# Patient Record
Sex: FEMALE | Race: WHITE | HISPANIC OR LATINO | Employment: UNEMPLOYED | URBAN - METROPOLITAN AREA
[De-identification: names, ages, dates, MRNs, and addresses within clinical notes are randomized per-mention and may not be internally consistent; named-entity substitution may affect disease eponyms.]

---

## 2023-05-15 ENCOUNTER — HOSPITAL ENCOUNTER (INPATIENT)
Facility: HOSPITAL | Age: 73
LOS: 9 days | Discharge: HOME | End: 2023-05-25
Attending: EMERGENCY MEDICINE | Admitting: STUDENT IN AN ORGANIZED HEALTH CARE EDUCATION/TRAINING PROGRAM

## 2023-05-15 DIAGNOSIS — F39 MOOD DISORDER (HCC): Primary | ICD-10-CM

## 2023-05-15 DIAGNOSIS — E53.8 B12 DEFICIENCY: ICD-10-CM

## 2023-05-15 DIAGNOSIS — F03.90 MAJOR NEUROCOGNITIVE DISORDER (HCC): ICD-10-CM

## 2023-05-15 DIAGNOSIS — F09 COGNITIVE DISORDER: ICD-10-CM

## 2023-05-15 DIAGNOSIS — E66.9 OBESITY (BMI 35.0-39.9 WITHOUT COMORBIDITY): ICD-10-CM

## 2023-05-15 DIAGNOSIS — R44.3 HALLUCINATIONS: ICD-10-CM

## 2023-05-15 DIAGNOSIS — Z00.8 MEDICAL CLEARANCE FOR PSYCHIATRIC ADMISSION: ICD-10-CM

## 2023-05-15 DIAGNOSIS — Z96.653 S/P TOTAL KNEE REPLACEMENT, BILATERAL: ICD-10-CM

## 2023-05-15 DIAGNOSIS — R45.1 AGITATION: ICD-10-CM

## 2023-05-15 DIAGNOSIS — E55.9 VITAMIN D INSUFFICIENCY: ICD-10-CM

## 2023-05-15 DIAGNOSIS — I10 ESSENTIAL HYPERTENSION: ICD-10-CM

## 2023-05-15 PROCEDURE — GZHZZZZ GROUP PSYCHOTHERAPY: ICD-10-PCS | Performed by: STUDENT IN AN ORGANIZED HEALTH CARE EDUCATION/TRAINING PROGRAM

## 2023-05-15 PROCEDURE — GZ59ZZZ INDIVIDUAL PSYCHOTHERAPY, PSYCHOPHYSIOLOGICAL: ICD-10-PCS | Performed by: STUDENT IN AN ORGANIZED HEALTH CARE EDUCATION/TRAINING PROGRAM

## 2023-05-16 PROBLEM — Z00.8 MEDICAL CLEARANCE FOR PSYCHIATRIC ADMISSION: Status: ACTIVE | Noted: 2023-05-16

## 2023-05-16 PROBLEM — E66.9 OBESITY (BMI 35.0-39.9 WITHOUT COMORBIDITY): Status: ACTIVE | Noted: 2023-05-16

## 2023-05-16 LAB
ALBUMIN SERPL BCP-MCNC: 4.9 G/DL (ref 3.5–5)
ALP SERPL-CCNC: 97 U/L (ref 34–104)
ALT SERPL W P-5'-P-CCNC: 11 U/L (ref 7–52)
AMPHETAMINES SERPL QL SCN: NEGATIVE
ANION GAP SERPL CALCULATED.3IONS-SCNC: 13 MMOL/L (ref 4–13)
AST SERPL W P-5'-P-CCNC: 24 U/L (ref 13–39)
ATRIAL RATE: 76 BPM
BACTERIA UR QL AUTO: ABNORMAL /HPF
BARBITURATES UR QL: NEGATIVE
BASOPHILS # BLD AUTO: 0.05 THOUSANDS/ÂΜL (ref 0–0.1)
BASOPHILS NFR BLD AUTO: 1 % (ref 0–1)
BENZODIAZ UR QL: NEGATIVE
BILIRUB SERPL-MCNC: 0.29 MG/DL (ref 0.2–1)
BILIRUB UR QL STRIP: NEGATIVE
BUN SERPL-MCNC: 34 MG/DL (ref 5–25)
CALCIUM SERPL-MCNC: 9.7 MG/DL (ref 8.4–10.2)
CHLORIDE SERPL-SCNC: 107 MMOL/L (ref 96–108)
CLARITY UR: CLEAR
CO2 SERPL-SCNC: 22 MMOL/L (ref 21–32)
COCAINE UR QL: NEGATIVE
COLOR UR: ABNORMAL
CREAT SERPL-MCNC: 1.2 MG/DL (ref 0.6–1.3)
EOSINOPHIL # BLD AUTO: 0.34 THOUSAND/ÂΜL (ref 0–0.61)
EOSINOPHIL NFR BLD AUTO: 7 % (ref 0–6)
ERYTHROCYTE [DISTWIDTH] IN BLOOD BY AUTOMATED COUNT: 12.4 % (ref 11.6–15.1)
ETHANOL SERPL-MCNC: <10 MG/DL
GFR SERPL CREATININE-BSD FRML MDRD: 44 ML/MIN/1.73SQ M
GLUCOSE SERPL-MCNC: 106 MG/DL (ref 65–140)
GLUCOSE UR STRIP-MCNC: NEGATIVE MG/DL
HCT VFR BLD AUTO: 34.2 % (ref 34.8–46.1)
HGB BLD-MCNC: 11.4 G/DL (ref 11.5–15.4)
HGB UR QL STRIP.AUTO: NEGATIVE
HYALINE CASTS #/AREA URNS LPF: ABNORMAL /LPF
IMM GRANULOCYTES # BLD AUTO: 0.01 THOUSAND/UL (ref 0–0.2)
IMM GRANULOCYTES NFR BLD AUTO: 0 % (ref 0–2)
KETONES UR STRIP-MCNC: NEGATIVE MG/DL
LEUKOCYTE ESTERASE UR QL STRIP: 100
LYMPHOCYTES # BLD AUTO: 1.51 THOUSANDS/ÂΜL (ref 0.6–4.47)
LYMPHOCYTES NFR BLD AUTO: 29 % (ref 14–44)
MCH RBC QN AUTO: 32.3 PG (ref 26.8–34.3)
MCHC RBC AUTO-ENTMCNC: 33.3 G/DL (ref 31.4–37.4)
MCV RBC AUTO: 97 FL (ref 82–98)
METHADONE UR QL: NEGATIVE
MONOCYTES # BLD AUTO: 0.52 THOUSAND/ÂΜL (ref 0.17–1.22)
MONOCYTES NFR BLD AUTO: 10 % (ref 4–12)
NEUTROPHILS # BLD AUTO: 2.81 THOUSANDS/ÂΜL (ref 1.85–7.62)
NEUTS SEG NFR BLD AUTO: 53 % (ref 43–75)
NITRITE UR QL STRIP: NEGATIVE
NON-SQ EPI CELLS URNS QL MICRO: ABNORMAL /HPF
NRBC BLD AUTO-RTO: 0 /100 WBCS
OPIATES UR QL SCN: NEGATIVE
OXYCODONE+OXYMORPHONE UR QL SCN: NEGATIVE
P AXIS: 22 DEGREES
PCP UR QL: NEGATIVE
PH UR STRIP.AUTO: 6 [PH]
PLATELET # BLD AUTO: 272 THOUSANDS/UL (ref 149–390)
PMV BLD AUTO: 9.9 FL (ref 8.9–12.7)
POTASSIUM SERPL-SCNC: 3.2 MMOL/L (ref 3.5–5.3)
PR INTERVAL: 166 MS
PROT SERPL-MCNC: 7.5 G/DL (ref 6.4–8.4)
PROT UR STRIP-MCNC: ABNORMAL MG/DL
QRS AXIS: -12 DEGREES
QRSD INTERVAL: 78 MS
QT INTERVAL: 402 MS
QTC INTERVAL: 452 MS
RBC # BLD AUTO: 3.53 MILLION/UL (ref 3.81–5.12)
RBC #/AREA URNS AUTO: ABNORMAL /HPF
SODIUM SERPL-SCNC: 142 MMOL/L (ref 135–147)
SP GR UR STRIP.AUTO: 1.02 (ref 1–1.04)
T WAVE AXIS: 9 DEGREES
THC UR QL: NEGATIVE
UROBILINOGEN UA: NEGATIVE MG/DL
VENTRICULAR RATE: 76 BPM
WBC # BLD AUTO: 5.24 THOUSAND/UL (ref 4.31–10.16)
WBC #/AREA URNS AUTO: ABNORMAL /HPF

## 2023-05-16 RX ORDER — HYDROXYZINE HYDROCHLORIDE 25 MG/1
25 TABLET, FILM COATED ORAL
Status: DISCONTINUED | OUTPATIENT
Start: 2023-05-16 | End: 2023-05-25 | Stop reason: HOSPADM

## 2023-05-16 RX ORDER — ACETAMINOPHEN 325 MG/1
650 TABLET ORAL EVERY 4 HOURS PRN
Status: CANCELLED | OUTPATIENT
Start: 2023-05-16

## 2023-05-16 RX ORDER — ACETAMINOPHEN 325 MG/1
975 TABLET ORAL EVERY 6 HOURS PRN
Status: CANCELLED | OUTPATIENT
Start: 2023-05-16

## 2023-05-16 RX ORDER — HYDROXYZINE HYDROCHLORIDE 25 MG/1
25 TABLET, FILM COATED ORAL
Status: CANCELLED | OUTPATIENT
Start: 2023-05-16

## 2023-05-16 RX ORDER — LORAZEPAM 1 MG/1
1 TABLET ORAL
Status: CANCELLED | OUTPATIENT
Start: 2023-05-16

## 2023-05-16 RX ORDER — HALOPERIDOL 5 MG/ML
5 INJECTION INTRAMUSCULAR
Status: DISCONTINUED | OUTPATIENT
Start: 2023-05-16 | End: 2023-05-17

## 2023-05-16 RX ORDER — LORAZEPAM 1 MG/1
1 TABLET ORAL
Status: DISCONTINUED | OUTPATIENT
Start: 2023-05-16 | End: 2023-05-25 | Stop reason: HOSPADM

## 2023-05-16 RX ORDER — ACETAMINOPHEN 325 MG/1
650 TABLET ORAL EVERY 4 HOURS PRN
Status: DISCONTINUED | OUTPATIENT
Start: 2023-05-16 | End: 2023-05-25 | Stop reason: HOSPADM

## 2023-05-16 RX ORDER — POTASSIUM CHLORIDE 750 MG/1
40 TABLET, EXTENDED RELEASE ORAL ONCE
Status: COMPLETED | OUTPATIENT
Start: 2023-05-16 | End: 2023-05-16

## 2023-05-16 RX ORDER — LORAZEPAM 2 MG/ML
1 INJECTION INTRAMUSCULAR
Status: CANCELLED | OUTPATIENT
Start: 2023-05-16

## 2023-05-16 RX ORDER — RISPERIDONE 1 MG/1
1 TABLET ORAL
Status: CANCELLED | OUTPATIENT
Start: 2023-05-16

## 2023-05-16 RX ORDER — RISPERIDONE 0.25 MG/1
0.25 TABLET ORAL
Status: DISCONTINUED | OUTPATIENT
Start: 2023-05-16 | End: 2023-05-17

## 2023-05-16 RX ORDER — HYDROXYZINE 50 MG/1
50 TABLET, FILM COATED ORAL
Status: DISCONTINUED | OUTPATIENT
Start: 2023-05-16 | End: 2023-05-25 | Stop reason: HOSPADM

## 2023-05-16 RX ORDER — LOSARTAN POTASSIUM 25 MG/1
25 TABLET ORAL DAILY
Status: DISCONTINUED | OUTPATIENT
Start: 2023-05-17 | End: 2023-05-25 | Stop reason: HOSPADM

## 2023-05-16 RX ORDER — RISPERIDONE 1 MG/1
1 TABLET ORAL
Status: DISCONTINUED | OUTPATIENT
Start: 2023-05-16 | End: 2023-05-17

## 2023-05-16 RX ORDER — LORAZEPAM 2 MG/ML
1 INJECTION INTRAMUSCULAR
Status: DISCONTINUED | OUTPATIENT
Start: 2023-05-16 | End: 2023-05-25 | Stop reason: HOSPADM

## 2023-05-16 RX ORDER — RISPERIDONE 0.5 MG/1
0.5 TABLET ORAL
Status: DISCONTINUED | OUTPATIENT
Start: 2023-05-16 | End: 2023-05-17

## 2023-05-16 RX ORDER — MELATONIN
1000 DAILY
Status: DISCONTINUED | OUTPATIENT
Start: 2023-05-17 | End: 2023-05-20

## 2023-05-16 RX ORDER — TRAZODONE HYDROCHLORIDE 50 MG/1
50 TABLET ORAL
Status: DISCONTINUED | OUTPATIENT
Start: 2023-05-16 | End: 2023-05-25 | Stop reason: HOSPADM

## 2023-05-16 RX ORDER — MELATONIN
5000 DAILY
Status: DISCONTINUED | OUTPATIENT
Start: 2023-05-16 | End: 2023-05-16

## 2023-05-16 RX ORDER — ACETAMINOPHEN 325 MG/1
975 TABLET ORAL EVERY 6 HOURS PRN
Status: DISCONTINUED | OUTPATIENT
Start: 2023-05-16 | End: 2023-05-25 | Stop reason: HOSPADM

## 2023-05-16 RX ORDER — HYDROCHLOROTHIAZIDE 25 MG/1
25 TABLET ORAL DAILY
Status: DISCONTINUED | OUTPATIENT
Start: 2023-05-16 | End: 2023-05-16

## 2023-05-16 RX ORDER — RISPERIDONE 0.25 MG/1
0.25 TABLET ORAL
Status: CANCELLED | OUTPATIENT
Start: 2023-05-16

## 2023-05-16 RX ORDER — LOSARTAN POTASSIUM 25 MG/1
25 TABLET ORAL DAILY
Status: DISCONTINUED | OUTPATIENT
Start: 2023-05-16 | End: 2023-05-16

## 2023-05-16 RX ORDER — MELATONIN
1000 DAILY
Status: CANCELLED | OUTPATIENT
Start: 2023-05-16

## 2023-05-16 RX ORDER — HYDROCHLOROTHIAZIDE 25 MG/1
25 TABLET ORAL DAILY
Status: DISCONTINUED | OUTPATIENT
Start: 2023-05-17 | End: 2023-05-25 | Stop reason: HOSPADM

## 2023-05-16 RX ORDER — HYDROXYZINE 50 MG/1
50 TABLET, FILM COATED ORAL
Status: CANCELLED | OUTPATIENT
Start: 2023-05-16

## 2023-05-16 RX ORDER — AMLODIPINE BESYLATE 10 MG/1
10 TABLET ORAL DAILY
Status: DISCONTINUED | OUTPATIENT
Start: 2023-05-17 | End: 2023-05-25 | Stop reason: HOSPADM

## 2023-05-16 RX ORDER — AMLODIPINE BESYLATE 10 MG/1
10 TABLET ORAL DAILY
Status: DISCONTINUED | OUTPATIENT
Start: 2023-05-16 | End: 2023-05-16

## 2023-05-16 RX ORDER — TRAZODONE HYDROCHLORIDE 50 MG/1
50 TABLET ORAL
Status: CANCELLED | OUTPATIENT
Start: 2023-05-16

## 2023-05-16 RX ORDER — HALOPERIDOL 5 MG/ML
5 INJECTION INTRAMUSCULAR
Status: CANCELLED | OUTPATIENT
Start: 2023-05-16

## 2023-05-16 RX ORDER — ATENOLOL 50 MG/1
100 TABLET ORAL DAILY
Status: DISCONTINUED | OUTPATIENT
Start: 2023-05-17 | End: 2023-05-25 | Stop reason: HOSPADM

## 2023-05-16 RX ORDER — LANOLIN ALCOHOL/MO/W.PET/CERES
3 CREAM (GRAM) TOPICAL
Status: DISCONTINUED | OUTPATIENT
Start: 2023-05-16 | End: 2023-05-17

## 2023-05-16 RX ORDER — ATENOLOL 50 MG/1
100 TABLET ORAL DAILY
Status: DISCONTINUED | OUTPATIENT
Start: 2023-05-16 | End: 2023-05-16

## 2023-05-16 RX ORDER — RISPERIDONE 0.25 MG/1
0.5 TABLET ORAL
Status: CANCELLED | OUTPATIENT
Start: 2023-05-16

## 2023-05-16 RX ADMIN — POTASSIUM CHLORIDE 40 MEQ: 750 TABLET, EXTENDED RELEASE ORAL at 03:24

## 2023-05-16 RX ADMIN — ACETAMINOPHEN 650 MG: 325 TABLET ORAL at 19:34

## 2023-05-16 NOTE — ED NOTES
Patient is accepted at [facility], 6[facility]  Patient is accepted by Dr Heidy Camarena per [employer]  Transportation is scheduled for **  Patient may go to the floor at **  Nurse report is to be called to [phone] at 15:30
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
## 2023-05-16 NOTE — ASSESSMENT & PLAN NOTE
· BP stable since she arrived to the unit  · Prehospital antihypertensive regimen includes  · amlodipine 10 mg p o  daily  · atenolol 100 mg p o  daily  · HCTZ 25 mg p o  daily  · losartan 25 mg p o  daily  · Continue while intpatient  · Monitor blood pressure  · Follow-up with PCP as an outpatient
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
## 2023-05-16 NOTE — ED NOTES
Call placed to answering service for [facility] ([facility]) AT [place] regarding Pt grandson being present to petition a 36  Crisis to follow up

## 2023-05-16 NOTE — ED NOTES
302 was petitioned by Pt jarvis Kelly Brandy  Upheld by ED attending Dr Juan Cid  Completed 302 was emailed to [facility] ([facility]) AT [place]  Pt does appear to understand her involuntary rights at this time  EVS (Eligibility Verification System) called - [phone]    Automated system indicates: not on file  *Pt confirms that she does not have any kind of insurance at this time
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
## 2023-05-16 NOTE — ED NOTES
Pt grandson called and gave permission to give updates to granddaughter Daniel Erica [phone]  Chart update        Zina Pickett  05/16/23 2049
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
## 2023-05-16 NOTE — ASSESSMENT & PLAN NOTE
· Vital signs stable    Reviewed admission labs  Patient is medically cleared for admission to the [facility] for treatment of the underlying psychiatric illness  · SL IM will sign off please call with any questions or concerns

## 2023-05-16 NOTE — PLAN OF CARE
Problem: Alteration in Thoughts and Perception  Goal: Treatment Goal: Gain control of psychotic behaviors/thinking, reduce/eliminate presenting symptoms and demonstrate improved reality functioning upon discharge  Outcome: Not Progressing  Goal: Verbalize thoughts and feelings  Description: Interventions:  - Promote a nonjudgmental and trusting relationship with the patient through active listening and therapeutic communication  - Assess patient's level of functioning, behavior and potential for risk  - Engage patient in 1 on 1 interactions  - Encourage patient to express fears, feelings, frustrations, and discuss symptoms    - Tower patient to reality, help patient recognize reality-based thinking   - Administer medications as ordered and assess for potential side effects  - Provide the patient education related to the signs and symptoms of the illness and desired effects of prescribed medications  Outcome: Not Progressing  Goal: Refrain from acting on delusional thinking/internal stimuli  Description: Interventions:  - Monitor patient closely, per order   - Utilize least restrictive measures   - Set reasonable limits, give positive feedback for acceptable   - Administer medications as ordered and monitor of potential side effects  Outcome: Not Progressing  Goal: Agree to be compliant with medication regime, as prescribed and report medication side effects  Description: Interventions:  - Offer appropriate PRN medication and supervise ingestion; conduct AIMS, as needed   Outcome: Not Progressing  Goal: Recognize dysfunctional thoughts, communicate reality-based thoughts at the time of discharge  Description: Interventions:  - Provide medication and psycho-education to assist patient in compliance and developing insight into his/her illness   Outcome: Not Progressing  Goal: Complete daily ADLs, including personal hygiene independently, as able  Description: Interventions:  - Observe, teach, and assist patient with ADLS  - Monitor and promote a balance of rest/activity, with adequate nutrition and elimination   Outcome: Not Progressing

## 2023-05-16 NOTE — ED NOTES
Assumed care for pt  Pt currently sleeping with no s/s of distress observed  Continues on Q15 for safety        Mando Devi RN  05/16/23 8595
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
## 2023-05-17 PROBLEM — F09 COGNITIVE DISORDER: Status: ACTIVE | Noted: 2023-05-17

## 2023-05-17 PROBLEM — F03.90 MAJOR NEUROCOGNITIVE DISORDER (HCC): Status: ACTIVE | Noted: 2023-05-17

## 2023-05-17 PROBLEM — F39 MOOD DISORDER (HCC): Status: ACTIVE | Noted: 2023-05-17

## 2023-05-17 PROBLEM — F39 UNSPECIFIED MOOD (AFFECTIVE) DISORDER (HCC): Status: ACTIVE | Noted: 2023-05-17

## 2023-05-17 LAB
25(OH)D3 SERPL-MCNC: 28 NG/ML (ref 30–100)
ALBUMIN SERPL BCP-MCNC: 4.1 G/DL (ref 3.5–5)
ALP SERPL-CCNC: 82 U/L (ref 34–104)
ALT SERPL W P-5'-P-CCNC: 10 U/L (ref 7–52)
ANION GAP SERPL CALCULATED.3IONS-SCNC: 9 MMOL/L (ref 4–13)
AST SERPL W P-5'-P-CCNC: 20 U/L (ref 13–39)
BASOPHILS # BLD AUTO: 0.04 THOUSANDS/ÂΜL (ref 0–0.1)
BASOPHILS NFR BLD AUTO: 1 % (ref 0–1)
BILIRUB SERPL-MCNC: 0.43 MG/DL (ref 0.2–1)
BUN SERPL-MCNC: 24 MG/DL (ref 5–25)
CALCIUM SERPL-MCNC: 9.5 MG/DL (ref 8.4–10.2)
CHLORIDE SERPL-SCNC: 108 MMOL/L (ref 96–108)
CO2 SERPL-SCNC: 24 MMOL/L (ref 21–32)
CREAT SERPL-MCNC: 1.03 MG/DL (ref 0.6–1.3)
EOSINOPHIL # BLD AUTO: 0.24 THOUSAND/ÂΜL (ref 0–0.61)
EOSINOPHIL NFR BLD AUTO: 6 % (ref 0–6)
ERYTHROCYTE [DISTWIDTH] IN BLOOD BY AUTOMATED COUNT: 12.4 % (ref 11.6–15.1)
FOLATE SERPL-MCNC: 18.6 NG/ML
GFR SERPL CREATININE-BSD FRML MDRD: 54 ML/MIN/1.73SQ M
GLUCOSE P FAST SERPL-MCNC: 88 MG/DL (ref 65–99)
GLUCOSE SERPL-MCNC: 88 MG/DL (ref 65–140)
HCT VFR BLD AUTO: 35.1 % (ref 34.8–46.1)
HGB BLD-MCNC: 11 G/DL (ref 11.5–15.4)
IMM GRANULOCYTES # BLD AUTO: 0.01 THOUSAND/UL (ref 0–0.2)
IMM GRANULOCYTES NFR BLD AUTO: 0 % (ref 0–2)
LYMPHOCYTES # BLD AUTO: 1.49 THOUSANDS/ÂΜL (ref 0.6–4.47)
LYMPHOCYTES NFR BLD AUTO: 38 % (ref 14–44)
MAGNESIUM SERPL-MCNC: 2 MG/DL (ref 1.9–2.7)
MCH RBC QN AUTO: 31.1 PG (ref 26.8–34.3)
MCHC RBC AUTO-ENTMCNC: 31.3 G/DL (ref 31.4–37.4)
MCV RBC AUTO: 99 FL (ref 82–98)
MONOCYTES # BLD AUTO: 0.42 THOUSAND/ÂΜL (ref 0.17–1.22)
MONOCYTES NFR BLD AUTO: 11 % (ref 4–12)
NEUTROPHILS # BLD AUTO: 1.73 THOUSANDS/ÂΜL (ref 1.85–7.62)
NEUTS SEG NFR BLD AUTO: 44 % (ref 43–75)
NRBC BLD AUTO-RTO: 0 /100 WBCS
PHOSPHATE SERPL-MCNC: 3.1 MG/DL (ref 2.3–4.1)
PLATELET # BLD AUTO: 253 THOUSANDS/UL (ref 149–390)
PMV BLD AUTO: 10.1 FL (ref 8.9–12.7)
POTASSIUM SERPL-SCNC: 3.8 MMOL/L (ref 3.5–5.3)
PROT SERPL-MCNC: 6.9 G/DL (ref 6.4–8.4)
RBC # BLD AUTO: 3.54 MILLION/UL (ref 3.81–5.12)
SODIUM SERPL-SCNC: 141 MMOL/L (ref 135–147)
VIT B12 SERPL-MCNC: 238 PG/ML (ref 180–914)
WBC # BLD AUTO: 3.93 THOUSAND/UL (ref 4.31–10.16)

## 2023-05-17 RX ORDER — QUETIAPINE FUMARATE 25 MG/1
25 TABLET, FILM COATED ORAL
Status: DISCONTINUED | OUTPATIENT
Start: 2023-05-17 | End: 2023-05-25 | Stop reason: HOSPADM

## 2023-05-17 RX ORDER — LANOLIN ALCOHOL/MO/W.PET/CERES
3 CREAM (GRAM) TOPICAL
Status: DISCONTINUED | OUTPATIENT
Start: 2023-05-17 | End: 2023-05-18

## 2023-05-17 RX ORDER — DONEPEZIL HYDROCHLORIDE 5 MG/1
5 TABLET, FILM COATED ORAL
Status: DISCONTINUED | OUTPATIENT
Start: 2023-05-17 | End: 2023-05-25 | Stop reason: HOSPADM

## 2023-05-17 RX ORDER — QUETIAPINE FUMARATE 100 MG/1
100 TABLET, FILM COATED ORAL
Status: DISCONTINUED | OUTPATIENT
Start: 2023-05-17 | End: 2023-05-25 | Stop reason: HOSPADM

## 2023-05-17 RX ORDER — QUETIAPINE FUMARATE 50 MG/1
50 TABLET, FILM COATED ORAL
Status: DISCONTINUED | OUTPATIENT
Start: 2023-05-17 | End: 2023-05-25 | Stop reason: HOSPADM

## 2023-05-17 RX ORDER — OLANZAPINE 10 MG/1
5 INJECTION, POWDER, LYOPHILIZED, FOR SOLUTION INTRAMUSCULAR
Status: DISCONTINUED | OUTPATIENT
Start: 2023-05-17 | End: 2023-05-25 | Stop reason: HOSPADM

## 2023-05-17 RX ADMIN — CHOLECALCIFEROL TAB 25 MCG (1000 UNIT) 1000 UNITS: 25 TAB at 08:14

## 2023-05-17 RX ADMIN — LOSARTAN POTASSIUM 25 MG: 25 TABLET, FILM COATED ORAL at 08:14

## 2023-05-17 RX ADMIN — DONEPEZIL HYDROCHLORIDE 5 MG: 5 TABLET, FILM COATED ORAL at 21:00

## 2023-05-17 RX ADMIN — ATENOLOL 100 MG: 50 TABLET ORAL at 08:14

## 2023-05-17 RX ADMIN — AMLODIPINE BESYLATE 10 MG: 10 TABLET ORAL at 08:14

## 2023-05-17 RX ADMIN — HYDROCHLOROTHIAZIDE 25 MG: 25 TABLET ORAL at 08:14

## 2023-05-17 NOTE — NURSING NOTE
Pt is pleasant and cooperative  Denies all psych symptoms at this time  Pt mostly isolative to self, but social with select M[place]ese speaking peers  Med/meal compliant  Offers no complaints at this time
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
## 2023-05-17 NOTE — TREATMENT PLAN
TREATMENT PLAN REVIEW - [address]  67 y o  1950 female MRN: [id number]    [facility] 6B [facility] Room / Bed: Jana Pacheco [address] Encounter: [id number]        Admit Date/Time:  5/15/2023 11:44 PM    Treatment Team: Attending Provider: Leona Sanon MD; Patient Care Assistant: Juan Alberto Ponce; Patient Care Assistant: Ivana Palomino; Registered Nurse: Saúl Yanez RN; Occupational Therapy Assistant: Riky Joy; Nurse Manager: Rachele Blackwell RN; Resident: Ana Garcia DO; : [facility]    Diagnosis: Principal Problem:    Cognitive disorder  Active Problems:    Essential hypertension    Medical clearance for psychiatric admission    Obesity (BMI 35 0-39 9 without comorbidity)      Patient Strengths/Assets: ability for insight, adapts well, family ties, financial means, general fund of knowledge, negotiates basic needs, patient is on a voluntary commitment, self reliant, stable housing      Patient Barriers/Limitations: impaired cognition, concern for safety in home    Short Term Goals: ability to stay safe on the unit, improvement in ability to express basic needs, increase in group attendance, increase in group participation, increase in socialization with peers on the unit    Long Term Goals: adequate self care, adequate sleep, adequate appetite, adequate oral intake, appropriate interaction with peers, stable living arrangements upon discharge, establishment of family support upon discharge    Progress Towards Goals: progressing, mood is stabilizing, less paranoid    Recommended Treatment: medication management, patient medication education, group therapy, milieu therapy, continued Behavioral Health psychiatric evaluation/assessment process     Treatment Frequency: daily medication monitoring, group and milieu therapy daily, monitoring through interdisciplinary rounds, monitoring through weekly patient care conferences    Expected Discharge Date: 5 days - 5/22/2023    Discharge Plan: discharge to home, referrals as indicated    Treatment Plan Created/Updated By: Manuela Clemens DO

## 2023-05-17 NOTE — PLAN OF CARE
Problem: Alteration in Thoughts and Perception  Goal: Treatment Goal: Gain control of psychotic behaviors/thinking, reduce/eliminate presenting symptoms and demonstrate improved reality functioning upon discharge  Outcome: Progressing  Goal: Verbalize thoughts and feelings  Description: Interventions:  - Promote a nonjudgmental and trusting relationship with the patient through active listening and therapeutic communication  - Assess patient's level of functioning, behavior and potential for risk  - Engage patient in 1 on 1 interactions  - Encourage patient to express fears, feelings, frustrations, and discuss symptoms    - [place] patient to reality, help patient recognize reality-based thinking   - Administer medications as ordered and assess for potential side effects  - Provide the patient education related to the signs and symptoms of the illness and desired effects of prescribed medications  Outcome: Progressing  Goal: Refrain from acting on delusional thinking/internal stimuli  Description: Interventions:  - Monitor patient closely, per order   - Utilize least restrictive measures   - Set reasonable limits, give positive feedback for acceptable   - Administer medications as ordered and monitor of potential side effects  Outcome: Progressing  Goal: Agree to be compliant with medication regime, as prescribed and report medication side effects  Description: Interventions:  - Offer appropriate PRN medication and supervise ingestion; conduct AIMS, as needed   Outcome: Progressing  Goal: Recognize dysfunctional thoughts, communicate reality-based thoughts at the time of discharge  Description: Interventions:  - Provide medication and psycho-education to assist patient in compliance and developing insight into his/her illness   Outcome: Progressing  Goal: Complete daily ADLs, including personal hygiene independently, as able  Description: Interventions:  - Observe, teach, and assist patient with ADLS  - Monitor and promote a balance of rest/activity, with adequate nutrition and elimination   Outcome: Progressing     Problem: Alteration in Thoughts and Perception  Goal: Attend and participate in unit activities, including therapeutic, recreational, and educational groups  Description: Interventions:  -Encourage Visitation and family involvement in care  Outcome: Progressing

## 2023-05-17 NOTE — CMS CERTIFICATION NOTE
Certification: Based upon physical, mental and social evaluations, I certify that inpatient psychiatric services are medically necessary for this patient for a duration of 21 midnights for the treatment of Major neurocognitive disorder ([facility] ) and Mood Disorder ([facility] )  Available alternative community resources do not meet the patient's mental health care needs  I further attest that an established written individualized plan of care has been implemented and is outlined in the patient's medical records

## 2023-05-17 NOTE — NURSING NOTE
Pt was visible on unit, social with Martiniquais speaking peers  Pt requested and received Tylenol 650mg for 6/10 headache, was effective  Pt refused melatonin at HS said she is allergic
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
## 2023-05-17 NOTE — PLAN OF CARE
Pt did not attend group when prompted or offered       Problem: Alteration in Thoughts and Perception  Goal: Attend and participate in unit activities, including therapeutic, recreational, and educational groups  Description: Interventions:  -Encourage Visitation and family involvement in care  Outcome: Not Progressing

## 2023-05-17 NOTE — ASSESSMENT & PLAN NOTE
· Well controlled  · Patient reports being diagnosed with HTN 3-4 years ago  · Currently taking Atenolol 100 mg qd, HCTZ 25 mg qd and losartan 25 mg qd   Reports compliance   · Cr: 1 03  · EKG 5/15/23: Normal sinus rhythm with moderate voltage criteria for LVH    Plan:  · Continue home medications  · Monitor BP

## 2023-05-17 NOTE — TREATMENT TEAM
05/17/23 1511   Team Meeting   Meeting Type Tx Team Meeting   Initial Conference Date 05/17/23   Team Members Present   Team Members Present Physician;Nurse;   Physician Team Member Dr Paula Galindo Team Member Yang Farley Management Team Member Jaspal Turner   Patient/Family Present   Patient Present Yes   Patient's Family Present No     Treatment plan and goals reviewed with pt; pt in agreement and signed
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
## 2023-05-18 ENCOUNTER — APPOINTMENT (INPATIENT)
Dept: CT IMAGING | Facility: HOSPITAL | Age: 73
End: 2023-05-18

## 2023-05-18 RX ADMIN — ATENOLOL 100 MG: 50 TABLET ORAL at 08:01

## 2023-05-18 RX ADMIN — LOSARTAN POTASSIUM 25 MG: 25 TABLET, FILM COATED ORAL at 08:01

## 2023-05-18 RX ADMIN — AMLODIPINE BESYLATE 10 MG: 10 TABLET ORAL at 08:01

## 2023-05-18 RX ADMIN — DONEPEZIL HYDROCHLORIDE 5 MG: 5 TABLET, FILM COATED ORAL at 21:08

## 2023-05-18 RX ADMIN — CHOLECALCIFEROL TAB 25 MCG (1000 UNIT) 1000 UNITS: 25 TAB at 08:01

## 2023-05-18 RX ADMIN — HYDROCHLOROTHIAZIDE 25 MG: 25 TABLET ORAL at 08:01

## 2023-05-18 NOTE — TREATMENT TEAM
05/18/23 0838   Team Meeting   Meeting Type Daily Rounds   Initial Conference Date 05/18/23   Team Members Present   Team Members Present Physician;Nurse;Occupational Therapist;;   Physician Team Member Dr Charlotte Tapia Team Member Kelechi Fernandez 5334 Management Team Member Xavier 116 Work Team Member Isael   OT Team Member Mary Jane ALLEN   Patient/Family Present   Patient Present No   Patient's Family Present No     Refused bedtime melatonin, slept through night, denies all s/s, no hallucinations, pending SLUMS testing, referral to Aging
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
## 2023-05-18 NOTE — CASE MANAGEMENT
Call made to pt's best'dtr Chloe Cobos tel# [phone]; no answer and unable to leave message  Call made to pt's Bette Turner tel# [phone]; wrong #

## 2023-05-18 NOTE — PLAN OF CARE
Problem: Alteration in Thoughts and Perception  Goal: Treatment Goal: Gain control of psychotic behaviors/thinking, reduce/eliminate presenting symptoms and demonstrate improved reality functioning upon discharge  Outcome: Progressing  Goal: Verbalize thoughts and feelings  Description: Interventions:  - Promote a nonjudgmental and trusting relationship with the patient through active listening and therapeutic communication  - Assess patient's level of functioning, behavior and potential for risk  - Engage patient in 1 on 1 interactions  - Encourage patient to express fears, feelings, frustrations, and discuss symptoms    - [place] patient to reality, help patient recognize reality-based thinking   - Administer medications as ordered and assess for potential side effects  - Provide the patient education related to the signs and symptoms of the illness and desired effects of prescribed medications  Outcome: Progressing  Goal: Refrain from acting on delusional thinking/internal stimuli  Description: Interventions:  - Monitor patient closely, per order   - Utilize least restrictive measures   - Set reasonable limits, give positive feedback for acceptable   - Administer medications as ordered and monitor of potential side effects  Outcome: Progressing  Goal: Agree to be compliant with medication regime, as prescribed and report medication side effects  Description: Interventions:  - Offer appropriate PRN medication and supervise ingestion; conduct AIMS, as needed   Outcome: Progressing  Goal: Recognize dysfunctional thoughts, communicate reality-based thoughts at the time of discharge  Description: Interventions:  - Provide medication and psycho-education to assist patient in compliance and developing insight into his/her illness   Outcome: Progressing  Goal: Complete daily ADLs, including personal hygiene independently, as able  Description: Interventions:  - Observe, teach, and assist patient with ADLS  - Monitor and promote a balance of rest/activity, with adequate nutrition and elimination   Outcome: Progressing     Problem: DISCHARGE PLANNING - CARE MANAGEMENT  Goal: Discharge to post-acute care or home with appropriate resources  Description: INTERVENTIONS:  - Conduct assessment to determine patient/family and health care team treatment goals, and need for post-acute services based on payer coverage, community resources, and patient preferences, and barriers to discharge  - Address psychosocial, clinical, and financial barriers to discharge as identified in assessment in conjunction with the patient/family and health care team  - Arrange appropriate level of post-acute services according to patient’s   needs and preference and payer coverage in collaboration with the physician and health care team  - Communicate with and update the patient/family, physician, and health care team regarding progress on the discharge plan  - Arrange appropriate transportation to post-acute venues  Outcome: Progressing     Problem: Alteration in Thoughts and Perception  Goal: Attend and participate in unit activities, including therapeutic, recreational, and educational groups  Description: Interventions:  -Encourage Visitation and family involvement in care  Outcome: Progressing

## 2023-05-18 NOTE — CASE MANAGEMENT
Call made to pt's PCP office tel# [phone]; informed pt's PCP is no longer at that office  CM informed pt's last visit to that office was in 2016
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
## 2023-05-18 NOTE — NURSING NOTE
Pt is pleasant on approach  Scant in conversation but cooperative with care  Denying all psych symptoms  Social with Swedish speaking peers  Med/meal compliant  Offers no complaints at this time

## 2023-05-18 NOTE — NURSING NOTE
Pt is visible on unit, social with peers and staff  Pt is pleasant and cooperative with care  Pt denies all psych s/s  Pt refused melatonin, took other medications  Safety checks ongoing

## 2023-05-19 LAB
HIV 1+2 AB+HIV1 P24 AG SERPL QL IA: NORMAL
HIV 2 AB SERPL QL IA: NORMAL
HIV1 AB SERPL QL IA: NORMAL
HIV1 P24 AG SERPL QL IA: NORMAL
TREPONEMA PALLIDUM IGG+IGM AB [PRESENCE] IN SERUM OR PLASMA BY IMMUNOASSAY: NORMAL
TSH SERPL DL<=0.05 MIU/L-ACNC: 1.56 UIU/ML (ref 0.45–4.5)

## 2023-05-19 RX ADMIN — ATENOLOL 100 MG: 50 TABLET ORAL at 08:12

## 2023-05-19 RX ADMIN — DONEPEZIL HYDROCHLORIDE 5 MG: 5 TABLET, FILM COATED ORAL at 21:20

## 2023-05-19 RX ADMIN — LOSARTAN POTASSIUM 25 MG: 25 TABLET, FILM COATED ORAL at 08:12

## 2023-05-19 RX ADMIN — AMLODIPINE BESYLATE 10 MG: 10 TABLET ORAL at 08:12

## 2023-05-19 RX ADMIN — CHOLECALCIFEROL TAB 25 MCG (1000 UNIT) 1000 UNITS: 25 TAB at 08:12

## 2023-05-19 RX ADMIN — HYDROCHLOROTHIAZIDE 25 MG: 25 TABLET ORAL at 08:12

## 2023-05-19 NOTE — NURSING NOTE
Pt is pleasant on approach  Denying all psych symptoms  Social with Angolan speaking peers but otherwise withdrawn  Med/memal compliant  Offers no complaints at this time

## 2023-05-19 NOTE — PROGRESS NOTES
Pt attended 1 am group today        05/19/23 1300   Activity/Group Checklist   Group Other (Comment)  (Mindfulness and journaling)   Attendance Did not attend

## 2023-05-19 NOTE — NURSING NOTE
Patient denies anxiety, depression, SI, HI, AH and VH  Patient is bright upon approach  Medication and meal compliant  Patient visible in the dayroom socializing with peers  Patient denies any needs at this time  Safety checks ongoing

## 2023-05-19 NOTE — PLAN OF CARE
Problem: Alteration in Thoughts and Perception  Goal: Treatment Goal: Gain control of psychotic behaviors/thinking, reduce/eliminate presenting symptoms and demonstrate improved reality functioning upon discharge  Outcome: Progressing  Goal: Verbalize thoughts and feelings  Description: Interventions:  - Promote a nonjudgmental and trusting relationship with the patient through active listening and therapeutic communication  - Assess patient's level of functioning, behavior and potential for risk  - Engage patient in 1 on 1 interactions  - Encourage patient to express fears, feelings, frustrations, and discuss symptoms    - [place] patient to reality, help patient recognize reality-based thinking   - Administer medications as ordered and assess for potential side effects  - Provide the patient education related to the signs and symptoms of the illness and desired effects of prescribed medications  Outcome: Progressing  Goal: Refrain from acting on delusional thinking/internal stimuli  Description: Interventions:  - Monitor patient closely, per order   - Utilize least restrictive measures   - Set reasonable limits, give positive feedback for acceptable   - Administer medications as ordered and monitor of potential side effects  Outcome: Progressing  Goal: Agree to be compliant with medication regime, as prescribed and report medication side effects  Description: Interventions:  - Offer appropriate PRN medication and supervise ingestion; conduct AIMS, as needed   Outcome: Progressing  Goal: Recognize dysfunctional thoughts, communicate reality-based thoughts at the time of discharge  Description: Interventions:  - Provide medication and psycho-education to assist patient in compliance and developing insight into his/her illness   Outcome: Progressing  Goal: Complete daily ADLs, including personal hygiene independently, as able  Description: Interventions:  - Observe, teach, and assist patient with ADLS  - Monitor and promote a balance of rest/activity, with adequate nutrition and elimination   Outcome: Progressing

## 2023-05-19 NOTE — TREATMENT TEAM
05/19/23 0829   Team Meeting   Meeting Type Daily Rounds   Initial Conference Date 05/19/23   Team Members Present   Team Members Present Physician;Nurse;;; Occupational Therapist   Physician Team Member Dr Yoon Kamara Team Member formerly [facility] Management Team Member Xavier Pizarro Work Team Member Isael   OT Team Member Iver Olszewski T   Patient/Family Present   Patient Present No   Patient's Family Present No     Did not attend group, visible, calm, pleasant, cooperative, SLUMS 10/30, cont attempts to obtain collateral information from family

## 2023-05-19 NOTE — NURSING NOTE
Pt observed on the unit  Continues to be calm and cooperative  Offered no concerns this shift  Lab draw attempted x1 and was unsuccessful   Will attempt in the a m

## 2023-05-19 NOTE — PROGRESS NOTES
05/19/23 1100   Activity/Group Checklist   Group Current events  (small doll group discussion on window mindfulness )   Attendance Attended   Attendance Duration (min) 0-15   Interactions Interacted appropriately   Affect/Mood Appropriate;Calm;Normal range   Goals Achieved Able to listen to others; Able to engage in interactions
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
## 2023-05-19 NOTE — PROGRESS NOTES
"  Progress Note - 612 [facility] 67 y o  female MRN: [id number]  Unit/Bed#: Lyndsay Shukla 847-52 Encounter: [id number]       Patient was visited on unit for continuing care; chart reviewed and discussed with multidisciplinary treatment team  On approach, the patient was calm, pleasant and cooperative  She was A&Ox3 today, but continues to have impaired short-term memory  Endorsed feeling better since being in the hospital  Denied any changes in appetite, and energy level  No problem initiating and maintaining sleep  Denied A/VH currently  Denied SI/HI, intent or plan upon direct inquiry at this time  Patient continues to be intermittently visible in the milieu and interacts with select staff and peers  No reports of aggression or self-injurious behavior on unit  No PRN medications used in the past 24 hours  Patient accepted all offered medications and reported feeling better  No adverse effects of medications noted or reported  Head CT showed microangiopathic changes; otherwise unremarkable, Pending HIV and RPR results  The patient has to remain in the hospital while awaiting placement- as their mental illness does not allow for them to be treated at a lower level of care  Case management is assertively/actively working on securing the necessary level of care        Current Mental Status Evaluation:  Appearance and attitude: appeared as stated age, dressed in hospital attire, with good hygiene  Eye contact: good  Motor Function: within normal limits, No PMA/PMR  Gait/station: Not observed  Speech: normal for rate, rhythm, volume, and latency with decreased amount  Language: No overt abnormality  Mood/affect: \"better\" / Affect was euthymic, reactive, in full range, normal intensity and mood congruent  Thought Processes: linear, slowing of thoughts  Thought content: denied suicidal ideations or homicidal ideations, no overt delusions elicited, paucity of thought  Associations: concrete " associations  Perceptual disturbances: denies Auditory/Visual/Tactile Hallucinations  Orientation: oriented to time, person, place and to the situational context  Cognitive Function: intact  Memory: impaired recall; intact remote  Intellect: average  Fund of knowledge: diminished  Impulse control: good  Insight/judgment: limited/limited    Vital signs in last 24 hours:    Temp:  [97 7 °F (36 5 °C)-98 3 °F (36 8 °C)] 98 3 °F (36 8 °C)  HR:  [83-88] 83  Resp:  [16-18] 16  BP: (130-145)/(63-71) 145/71    Laboratory results: I have personally reviewed all pertinent laboratory/tests results    Results from the past 24 hours:   Recent Results (from the past 24 hour(s))   TSH, 3rd generation with Free T4 reflex    Collection Time: 05/19/23  4:32 AM   Result Value Ref Range    TSH 3RD GENERATON 1 562 0 450 - 4 500 uIU/mL       Progress Toward Goals: improving    Assessment:  Principal Problem:    Major neurocognitive disorder ([facility] 75 )  Active Problems:    Essential hypertension    Medical clearance for psychiatric admission    Obesity (BMI 35 0-39 9 without comorbidity)    Mood disorder ([facility] )        Plan:  - f/u SLIM recs regarding the medical problems  - Encourage early mobility and having a structured day  - Provide frequent re-orientation, and cognitive stimulation  - Ensure assistive devices are in proper working order (eye-glasses, hearing aids)  - Encourage adequate hydration, nutrition and monitor bowel movements  - Maintain sleep-wake cycle: Uninterrupted sleep time; low-level lighting at night  - fall precaution  - f/u labs  - Continue medication titration and treatment plan; adjust medication to optimize treatment response and as clinically indicated       Scheduled medications:  Current Facility-Administered Medications   Medication Dose Route Frequency Provider Last Rate   • acetaminophen  650 mg Oral Q4H PRN LILIYA Holloway     • acetaminophen  650 mg Oral Q4H PRN LILIYA Holloway     • acetaminophen 975 mg Oral Q6H PRN Manda Jump, CRNP     • amLODIPine  10 mg Oral Daily Kimberli Russ, CRNP     • atenolol  100 mg Oral Daily Kimberli Russ, CRNP     • cholecalciferol  1,000 Units Oral Daily Manda Jump, CRNP     • donepezil  5 mg Oral HS Mary Mobley, DO     • hydrochlorothiazide  25 mg Oral Daily Kimberli Russ, CRNP     • hydrOXYzine HCL  25 mg Oral Q6H PRN Max 4/day Manda Jump, CRNP     • hydrOXYzine HCL  50 mg Oral Q6H PRN Max 4/day Manda Jump, CRNP     • LORazepam  1 mg Intramuscular Q6H PRN Max 3/day Manda Jump, CRNP     • LORazepam  1 mg Oral Q6H PRN Max 3/day Manda Jump, CRNP     • losartan  25 mg Oral Daily Kimberli Russ, CRNP     • OLANZapine  5 mg Intramuscular Q3H PRN Max 3/day Mary Mobley, DO     • QUEtiapine  100 mg Oral Q3H PRN Max 3/day Mary Mobley, DO     • QUEtiapine  25 mg Oral Q6H PRN Max 4/day Mary Mobley, DO     • QUEtiapine  50 mg Oral Q6H PRN Max 4/day Mary Mobley, DO     • traZODone  50 mg Oral HS PRN Manda Jump, CRNP          PRN:  •  acetaminophen  •  acetaminophen  •  acetaminophen  •  hydrOXYzine HCL  •  hydrOXYzine HCL  •  LORazepam  •  LORazepam  •  OLANZapine  •  QUEtiapine  •  QUEtiapine  •  QUEtiapine  •  traZODone    - Observation: routine    - VS: as per unit protocol  - Diet: Regular diet  - Psychoeducation (benefits and potential risks) discussed, importance of compliance with the psychiatric treatment reiterated, and the patient verbalized understanding of the matter  - Encourage group attendance and milieu therapy    - The pt was educated and agreed to verbalize any suicidal thoughts, frustrations or concerns to the nursing staff, immediately      - Dispo: TBD       Next of Kin  · Extended Emergency Contact Information  · Primary Emergency Contact: Jil Cope  · Mobile Phone: [phone]  · Relation: Grandchild  · Secondary Emergency Contact: Osiel Rodriguez  · Mobile Phone: [phone]  · Relation: Grandchild      Counseling / Coordination of Care  Patient's progress discussed with staff in treatment team meeting  Medications, treatment progress and treatment plan reviewed with patient  Supportive therapy provided to patient  Cognitive techniques utilized during the session  Reassurance and supportive therapy provided  Reoriented to reality and reassured  Encouraged participation in milieu and group therapy on the unit       Alicia Marie MD  Attending SP[facility] [address]

## 2023-05-20 PROBLEM — E55.9 VITAMIN D INSUFFICIENCY: Status: ACTIVE | Noted: 2023-05-20

## 2023-05-20 PROBLEM — E53.8 B12 DEFICIENCY: Status: ACTIVE | Noted: 2023-05-20

## 2023-05-20 RX ORDER — CYANOCOBALAMIN 1000 UG/ML
1000 INJECTION, SOLUTION INTRAMUSCULAR; SUBCUTANEOUS
Status: DISCONTINUED | OUTPATIENT
Start: 2023-05-20 | End: 2023-05-25 | Stop reason: HOSPADM

## 2023-05-20 RX ORDER — MELATONIN
2000 DAILY
Status: DISCONTINUED | OUTPATIENT
Start: 2023-05-21 | End: 2023-05-25 | Stop reason: HOSPADM

## 2023-05-20 RX ADMIN — ATENOLOL 100 MG: 50 TABLET ORAL at 08:21

## 2023-05-20 RX ADMIN — AMLODIPINE BESYLATE 10 MG: 10 TABLET ORAL at 08:21

## 2023-05-20 RX ADMIN — DONEPEZIL HYDROCHLORIDE 5 MG: 5 TABLET, FILM COATED ORAL at 21:06

## 2023-05-20 RX ADMIN — LOSARTAN POTASSIUM 25 MG: 25 TABLET, FILM COATED ORAL at 08:21

## 2023-05-20 RX ADMIN — HYDROCHLOROTHIAZIDE 25 MG: 25 TABLET ORAL at 08:21

## 2023-05-20 RX ADMIN — CYANOCOBALAMIN 1000 MCG: 1000 INJECTION INTRAMUSCULAR; SUBCUTANEOUS at 11:36

## 2023-05-20 RX ADMIN — CHOLECALCIFEROL TAB 25 MCG (1000 UNIT) 1000 UNITS: 25 TAB at 08:21

## 2023-05-20 RX ADMIN — ACETAMINOPHEN 650 MG: 325 TABLET ORAL at 21:06

## 2023-05-20 NOTE — NURSING NOTE
Patient calm, cooperative, sociable with select peers and is visible on the millieu  She denies depression, anxiety, HI/SI/AVH and is able to make needs known  She reports sleeping well and is not able to report her correct birthdate at this time only that she was born in [birth year]  She reports her birth certificate from [facility] indicates August 7, 1950 but is not sure that is the correct date
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
## 2023-05-20 NOTE — ASSESSMENT & PLAN NOTE
· Vitamin D level at 238  · Give cyanocobalamin 1000 mcg IM once  · Daily supplementation with cyanocobalamin 1000 mcg daily  · Encourage PCP follow up for routine monitoring

## 2023-05-20 NOTE — PLAN OF CARE
Problem: Alteration in Thoughts and Perception  Goal: Treatment Goal: Gain control of psychotic behaviors/thinking, reduce/eliminate presenting symptoms and demonstrate improved reality functioning upon discharge  Outcome: Progressing  Goal: Verbalize thoughts and feelings  Description: Interventions:  - Promote a nonjudgmental and trusting relationship with the patient through active listening and therapeutic communication  - Assess patient's level of functioning, behavior and potential for risk  - Engage patient in 1 on 1 interactions  - Encourage patient to express fears, feelings, frustrations, and discuss symptoms    - [place] patient to reality, help patient recognize reality-based thinking   - Administer medications as ordered and assess for potential side effects  - Provide the patient education related to the signs and symptoms of the illness and desired effects of prescribed medications  Outcome: Progressing  Goal: Refrain from acting on delusional thinking/internal stimuli  Description: Interventions:  - Monitor patient closely, per order   - Utilize least restrictive measures   - Set reasonable limits, give positive feedback for acceptable   - Administer medications as ordered and monitor of potential side effects  Outcome: Progressing  Goal: Agree to be compliant with medication regime, as prescribed and report medication side effects  Description: Interventions:  - Offer appropriate PRN medication and supervise ingestion; conduct AIMS, as needed   Outcome: Progressing  Goal: Recognize dysfunctional thoughts, communicate reality-based thoughts at the time of discharge  Description: Interventions:  - Provide medication and psycho-education to assist patient in compliance and developing insight into his/her illness   Outcome: Progressing  Goal: Complete daily ADLs, including personal hygiene independently, as able  Description: Interventions:  - Observe, teach, and assist patient with ADLS  - Monitor and promote a balance of rest/activity, with adequate nutrition and elimination   Outcome: Progressing     Problem: DISCHARGE PLANNING - CARE MANAGEMENT  Goal: Discharge to post-acute care or home with appropriate resources  Description: INTERVENTIONS:  - Conduct assessment to determine patient/family and health care team treatment goals, and need for post-acute services based on payer coverage, community resources, and patient preferences, and barriers to discharge  - Address psychosocial, clinical, and financial barriers to discharge as identified in assessment in conjunction with the patient/family and health care team  - Arrange appropriate level of post-acute services according to patient’s   needs and preference and payer coverage in collaboration with the physician and health care team  - Communicate with and update the patient/family, physician, and health care team regarding progress on the discharge plan  - Arrange appropriate transportation to post-acute venues  Outcome: Progressing

## 2023-05-20 NOTE — NURSING NOTE
Patient re-confirmed her  = 1950 and her home address [address] [place] [address]  This is per a telephone call from her grandson

## 2023-05-21 RX ADMIN — DONEPEZIL HYDROCHLORIDE 5 MG: 5 TABLET, FILM COATED ORAL at 21:18

## 2023-05-21 RX ADMIN — ATENOLOL 100 MG: 50 TABLET ORAL at 08:03

## 2023-05-21 RX ADMIN — LOSARTAN POTASSIUM 25 MG: 25 TABLET, FILM COATED ORAL at 08:03

## 2023-05-21 RX ADMIN — AMLODIPINE BESYLATE 10 MG: 10 TABLET ORAL at 08:03

## 2023-05-21 RX ADMIN — CYANOCOBALAMIN TAB 1000 MCG 1000 MCG: 1000 TAB at 08:08

## 2023-05-21 RX ADMIN — CHOLECALCIFEROL TAB 25 MCG (1000 UNIT) 2000 UNITS: 25 TAB at 08:07

## 2023-05-21 RX ADMIN — HYDROCHLOROTHIAZIDE 25 MG: 25 TABLET ORAL at 08:03

## 2023-05-21 NOTE — NURSING NOTE
Patient calm, pleasant and visible on millieu  She is sociable with select peers and is able to make needs known  She takes medications as scheduled and denies HI/SI/AVH and is able to make needs known  Patient reports sleeping well

## 2023-05-21 NOTE — PLAN OF CARE
Problem: Alteration in Thoughts and Perception  Goal: Treatment Goal: Gain control of psychotic behaviors/thinking, reduce/eliminate presenting symptoms and demonstrate improved reality functioning upon discharge  Outcome: Progressing  Goal: Verbalize thoughts and feelings  Description: Interventions:  - Promote a nonjudgmental and trusting relationship with the patient through active listening and therapeutic communication  - Assess patient's level of functioning, behavior and potential for risk  - Engage patient in 1 on 1 interactions  - Encourage patient to express fears, feelings, frustrations, and discuss symptoms    - [place] patient to reality, help patient recognize reality-based thinking   - Administer medications as ordered and assess for potential side effects  - Provide the patient education related to the signs and symptoms of the illness and desired effects of prescribed medications  Outcome: Progressing  Goal: Refrain from acting on delusional thinking/internal stimuli  Description: Interventions:  - Monitor patient closely, per order   - Utilize least restrictive measures   - Set reasonable limits, give positive feedback for acceptable   - Administer medications as ordered and monitor of potential side effects  Outcome: Progressing  Goal: Agree to be compliant with medication regime, as prescribed and report medication side effects  Description: Interventions:  - Offer appropriate PRN medication and supervise ingestion; conduct AIMS, as needed   Outcome: Progressing  Goal: Recognize dysfunctional thoughts, communicate reality-based thoughts at the time of discharge  Description: Interventions:  - Provide medication and psycho-education to assist patient in compliance and developing insight into his/her illness   Outcome: Progressing  Goal: Complete daily ADLs, including personal hygiene independently, as able  Description: Interventions:  - Observe, teach, and assist patient with ADLS  - Monitor and promote a balance of rest/activity, with adequate nutrition and elimination   Outcome: Progressing     Problem: DISCHARGE PLANNING - CARE MANAGEMENT  Goal: Discharge to post-acute care or home with appropriate resources  Description: INTERVENTIONS:  - Conduct assessment to determine patient/family and health care team treatment goals, and need for post-acute services based on payer coverage, community resources, and patient preferences, and barriers to discharge  - Address psychosocial, clinical, and financial barriers to discharge as identified in assessment in conjunction with the patient/family and health care team  - Arrange appropriate level of post-acute services according to patient’s   needs and preference and payer coverage in collaboration with the physician and health care team  - Communicate with and update the patient/family, physician, and health care team regarding progress on the discharge plan  - Arrange appropriate transportation to post-acute venues  Outcome: Progressing

## 2023-05-21 NOTE — TREATMENT TEAM
----- Message from Nicola Galaviz MD sent at 11/6/2017  4:28 PM CST -----  The heart stress test is completely normal   05/20/23 4084   Pain Assessment   Pain Assessment Tool 0-10   Pain Score 5   Pain Location/Orientation Location: Generalized   Pain Radiating Towards none   Pain Onset/Description Onset: Ongoing   Effect of Pain on Daily Activities comfort   Patient's Stated Pain Goal No pain   Hospital Pain Intervention(s) Medication (See MAR)   Multiple Pain Sites No     APAP 650mg given prn for 5/10 generalized pain  Will monitor for effectiveness

## 2023-05-21 NOTE — TREATMENT TEAM
05/20/23 9479   Pain Assessment Post Intervention   Reason Not Indicated Other (comment)   Pain Assessment Tool Used: 0-10   Post Intervention Pain Score 2   Post Intervention Pain Location/Orientation Location: Generalized   Response to Interventions mildly effective     APAP mildly effective at this time
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
## 2023-05-22 RX ADMIN — ATENOLOL 100 MG: 50 TABLET ORAL at 08:31

## 2023-05-22 RX ADMIN — CYANOCOBALAMIN TAB 1000 MCG 1000 MCG: 1000 TAB at 08:31

## 2023-05-22 RX ADMIN — LOSARTAN POTASSIUM 25 MG: 25 TABLET, FILM COATED ORAL at 08:31

## 2023-05-22 RX ADMIN — HYDROCHLOROTHIAZIDE 25 MG: 25 TABLET ORAL at 08:31

## 2023-05-22 RX ADMIN — AMLODIPINE BESYLATE 10 MG: 10 TABLET ORAL at 08:31

## 2023-05-22 RX ADMIN — DONEPEZIL HYDROCHLORIDE 5 MG: 5 TABLET, FILM COATED ORAL at 21:13

## 2023-05-22 RX ADMIN — CHOLECALCIFEROL TAB 25 MCG (1000 UNIT) 2000 UNITS: 25 TAB at 08:31

## 2023-05-22 NOTE — PLAN OF CARE
Problem: Alteration in Thoughts and Perception  Goal: Treatment Goal: Gain control of psychotic behaviors/thinking, reduce/eliminate presenting symptoms and demonstrate improved reality functioning upon discharge  Outcome: Progressing  Goal: Verbalize thoughts and feelings  Description: Interventions:  - Promote a nonjudgmental and trusting relationship with the patient through active listening and therapeutic communication  - Assess patient's level of functioning, behavior and potential for risk  - Engage patient in 1 on 1 interactions  - Encourage patient to express fears, feelings, frustrations, and discuss symptoms    - [place] patient to reality, help patient recognize reality-based thinking   - Administer medications as ordered and assess for potential side effects  - Provide the patient education related to the signs and symptoms of the illness and desired effects of prescribed medications  Outcome: Progressing  Goal: Refrain from acting on delusional thinking/internal stimuli  Description: Interventions:  - Monitor patient closely, per order   - Utilize least restrictive measures   - Set reasonable limits, give positive feedback for acceptable   - Administer medications as ordered and monitor of potential side effects  Outcome: Progressing  Goal: Agree to be compliant with medication regime, as prescribed and report medication side effects  Description: Interventions:  - Offer appropriate PRN medication and supervise ingestion; conduct AIMS, as needed   Outcome: Progressing  Goal: Recognize dysfunctional thoughts, communicate reality-based thoughts at the time of discharge  Description: Interventions:  - Provide medication and psycho-education to assist patient in compliance and developing insight into his/her illness   Outcome: Progressing  Goal: Complete daily ADLs, including personal hygiene independently, as able  Description: Interventions:  - Observe, teach, and assist patient with ADLS  - Monitor and promote a balance of rest/activity, with adequate nutrition and elimination   Outcome: Progressing     Problem: DISCHARGE PLANNING - CARE MANAGEMENT  Goal: Discharge to post-acute care or home with appropriate resources  Description: INTERVENTIONS:  - Conduct assessment to determine patient/family and health care team treatment goals, and need for post-acute services based on payer coverage, community resources, and patient preferences, and barriers to discharge  - Address psychosocial, clinical, and financial barriers to discharge as identified in assessment in conjunction with the patient/family and health care team  - Arrange appropriate level of post-acute services according to patient’s   needs and preference and payer coverage in collaboration with the physician and health care team  - Communicate with and update the patient/family, physician, and health care team regarding progress on the discharge plan  - Arrange appropriate transportation to post-acute venues  Outcome: Progressing
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
## 2023-05-22 NOTE — TREATMENT TEAM
05/22/23 0837   Team Meeting   Meeting Type Daily Rounds   Initial Conference Date 05/22/23   Team Members Present   Team Members Present Physician;Nurse;Occupational Therapist;;   Physician Team Member Dr Cedric Madrigal Team Member [address] Management Team Member [address] Work Team Member Isael   OT Team Member Holly ALLEN   Patient/Family Present   Patient Present No   Patient's Family Present No     Visible, calm, pleasant, cooperative, med compliant, cont with attempts to reach pt's granddaughter

## 2023-05-22 NOTE — TREATMENT TEAM
"Met with pt to attempt to complete [address] relapse prevention plan  Pt noted signs and symptoms upon admission were: \"seeing lights and problems with Grandson (Leno)\"  Crisis, 988 and warmline phone number provided  Pt signed and copy in chart  Pt encouraged to attend groups, make needs known and be visible on unit       05/22/23 1045   Activity/Group Checklist   Group Admission/Discharge   Attendance Attended   Attendance Duration (min) 16-30   Interactions Disorganized interaction   Affect/Mood Appropriate   Goals Achieved Identified feelings; Able to listen to others; Able to engage in interactions       "

## 2023-05-22 NOTE — NURSING NOTE
Pt present on the unit and mostly reserved to self  Observed minimally interacting with a selected [place]ic speaking peer  Reported doing well and her mood remains euthymic  Affect is bright and congruent with her mood  Denies any concerns during nursing encounters  Medication compliant

## 2023-05-22 NOTE — NURSING NOTE
Patient calm, pleasant, visible on miliieu and sociable with peers  She is able to make needs known and takes mediations as scheduled  She denies HI/SI/AVH and reports sleeping well and has a good appetite

## 2023-05-22 NOTE — CASE MANAGEMENT
Cm spoke with pt's g'dtr Dorothyann Dance, reviewed pt's status and plan for d/c Thurs  Jil reports plans to come to [place] and take pt to [place] week after d/c  Cm informed Jil of pt's SLUM test results  Jil reports she is pt's POA although does not have copy of POA  Jil reports awaiting copy of POA to be mailed to her from pt's sister  Jil requested that pt's grandson Jga Valladares be removed from pt's contact list  Dorothyann Dance provided contact info for pt's grandson Kongshayy Melchor tel# [phone]; reports Kenan Roche will provide pt transport at d/c  Jil provided her email for d/c paperwork to be sent  Jil also to send CM copy of POA once received in mail  Jil's email: [email or web address]    Cm rec'd call from Kenan Roche stating he spoke with Jil and will provide d/c transport  CM informed Kenan Roche of d/c Thursday  ANKIT also reviewed recommendation for pt to utilize weekly pill box with Piyush's assistance

## 2023-05-22 NOTE — PLAN OF CARE
Pt did  not attend when prompted or offered       Problem: Alteration in Thoughts and Perception  Goal: Attend and participate in unit activities, including therapeutic, recreational, and educational groups  Description: Interventions:  -Encourage Visitation and family involvement in care  Outcome: Not Progressing

## 2023-05-23 RX ADMIN — HYDROCHLOROTHIAZIDE 25 MG: 25 TABLET ORAL at 08:21

## 2023-05-23 RX ADMIN — CHOLECALCIFEROL TAB 25 MCG (1000 UNIT) 2000 UNITS: 25 TAB at 08:21

## 2023-05-23 RX ADMIN — CYANOCOBALAMIN TAB 1000 MCG 1000 MCG: 1000 TAB at 08:21

## 2023-05-23 RX ADMIN — AMLODIPINE BESYLATE 10 MG: 10 TABLET ORAL at 08:20

## 2023-05-23 RX ADMIN — DONEPEZIL HYDROCHLORIDE 5 MG: 5 TABLET, FILM COATED ORAL at 21:27

## 2023-05-23 RX ADMIN — LOSARTAN POTASSIUM 25 MG: 25 TABLET, FILM COATED ORAL at 08:21

## 2023-05-23 RX ADMIN — ATENOLOL 100 MG: 50 TABLET ORAL at 08:20

## 2023-05-23 NOTE — TREATMENT TEAM
05/23/23 0834   Team Meeting   Meeting Type Daily Rounds   Initial Conference Date 05/23/23   Team Members Present   Team Members Present Physician;Nurse;Occupational Therapist;;   Physician Team Member [address] [facility] Team Member JOÃO [facility] Management Team Member Xavier Pizarro Work Team Member Isael   OT Team Member Cleveland Ladd   Patient/Family Present   Patient Present No   Patient's Family Present No     Denies psych s/s, calm, pleasant, sleeping well, med compliant

## 2023-05-23 NOTE — PLAN OF CARE
Pt did not attend groups when prompted      Problem: Alteration in Thoughts and Perception  Goal: Attend and participate in unit activities, including therapeutic, recreational, and educational groups  Description: Interventions:  -Encourage Visitation and family involvement in care  Outcome: Not Progressing

## 2023-05-23 NOTE — CASE MANAGEMENT
Call made to pt's granddaughter Mari Price tel# [phone], no answer and unable to leave message  Email sent to [facility] requesting call as soon as possible regarding pt's concerns about Jase Syed

## 2023-05-23 NOTE — CASE MANAGEMENT
Call made to Henri Clay, [facility] Adult protective services tel# [phone]; update provided regarding pt's concerns about grandson Marsha Parents  ANKIT informed Rosi of Jil's plans to come from [place] next week  Rosi reports she has not been able to get in touch with Jil PHAM provided Rosi with Jil's email info and contact tel# for Marsha Parents  Jil reports need to obtain Alaner's contact tel#  ANKIT deferred Reg[place] to speak with Jil Tapia reports her agency will not be able to assist pt with removing Marsha Parents from her home
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
## 2023-05-23 NOTE — PLAN OF CARE
Problem: Alteration in Thoughts and Perception  Goal: Treatment Goal: Gain control of psychotic behaviors/thinking, reduce/eliminate presenting symptoms and demonstrate improved reality functioning upon discharge  Outcome: Progressing  Goal: Verbalize thoughts and feelings  Description: Interventions:  - Promote a nonjudgmental and trusting relationship with the patient through active listening and therapeutic communication  - Assess patient's level of functioning, behavior and potential for risk  - Engage patient in 1 on 1 interactions  - Encourage patient to express fears, feelings, frustrations, and discuss symptoms    - [place] patient to reality, help patient recognize reality-based thinking   - Administer medications as ordered and assess for potential side effects  - Provide the patient education related to the signs and symptoms of the illness and desired effects of prescribed medications  Outcome: Progressing  Goal: Refrain from acting on delusional thinking/internal stimuli  Description: Interventions:  - Monitor patient closely, per order   - Utilize least restrictive measures   - Set reasonable limits, give positive feedback for acceptable   - Administer medications as ordered and monitor of potential side effects  Outcome: Progressing  Goal: Agree to be compliant with medication regime, as prescribed and report medication side effects  Description: Interventions:  - Offer appropriate PRN medication and supervise ingestion; conduct AIMS, as needed   Outcome: Progressing  Goal: Recognize dysfunctional thoughts, communicate reality-based thoughts at the time of discharge  Description: Interventions:  - Provide medication and psycho-education to assist patient in compliance and developing insight into his/her illness   Outcome: Progressing  Goal: Complete daily ADLs, including personal hygiene independently, as able  Description: Interventions:  - Observe, teach, and assist patient with ADLS  - Monitor and promote a balance of rest/activity, with adequate nutrition and elimination   Outcome: Progressing     Problem: DISCHARGE PLANNING - CARE MANAGEMENT  Goal: Discharge to post-acute care or home with appropriate resources  Description: INTERVENTIONS:  - Conduct assessment to determine patient/family and health care team treatment goals, and need for post-acute services based on payer coverage, community resources, and patient preferences, and barriers to discharge  - Address psychosocial, clinical, and financial barriers to discharge as identified in assessment in conjunction with the patient/family and health care team  - Arrange appropriate level of post-acute services according to patient’s   needs and preference and payer coverage in collaboration with the physician and health care team  - Communicate with and update the patient/family, physician, and health care team regarding progress on the discharge plan  - Arrange appropriate transportation to post-acute venues  Outcome: Progressing     Problem: Alteration in Thoughts and Perception  Goal: Attend and participate in unit activities, including therapeutic, recreational, and educational groups  Description: Interventions:  -Encourage Visitation and family involvement in care  Outcome: Progressing

## 2023-05-24 RX ADMIN — CYANOCOBALAMIN TAB 1000 MCG 1000 MCG: 1000 TAB at 08:34

## 2023-05-24 RX ADMIN — ATENOLOL 100 MG: 50 TABLET ORAL at 08:34

## 2023-05-24 RX ADMIN — CHOLECALCIFEROL TAB 25 MCG (1000 UNIT) 2000 UNITS: 25 TAB at 08:34

## 2023-05-24 RX ADMIN — LOSARTAN POTASSIUM 25 MG: 25 TABLET, FILM COATED ORAL at 08:34

## 2023-05-24 RX ADMIN — ACETAMINOPHEN 975 MG: 325 TABLET ORAL at 21:22

## 2023-05-24 RX ADMIN — AMLODIPINE BESYLATE 10 MG: 10 TABLET ORAL at 08:34

## 2023-05-24 RX ADMIN — DONEPEZIL HYDROCHLORIDE 5 MG: 5 TABLET, FILM COATED ORAL at 21:22

## 2023-05-24 RX ADMIN — HYDROCHLOROTHIAZIDE 25 MG: 25 TABLET ORAL at 08:34

## 2023-05-24 NOTE — NURSING NOTE
Patient calm pleasant and cooperative, medications taken, visible in milieu, social with select peers, makes needs known, denies anxiety/depression  Denies SI/HI, AH/VH  Safety precautions maintained

## 2023-05-24 NOTE — NURSING NOTE
Pt is visible on unit, social with peers and staff  Pt is pleasant and cooperative with care  Pt denies anxiety and depression, denies SI,HI,AVH  Medication complaint, safety checks ongoing

## 2023-05-24 NOTE — PROGRESS NOTES
Progress Note - 612 [facility] 67 y o  female MRN: [id number]  Unit/Bed#: Maggie Hernandez 911-27 Encounter: [id number]    Assessment/Plan   Principal Problem:    Major neurocognitive disorder [facility])  Active Problems:    Essential hypertension    Medical clearance for psychiatric admission    Obesity (BMI 35 0-39 9 without comorbidity)    Mood disorder ([address] )    B12 deficiency    Vitamin D insufficiency      Recommendations:   Patient continues to improve  No psychopharmacologic changes necessary at this moment; will continue to assess daily for further optimization  • Continue with current ongoing management  o Continue with pharmacotherapy, group therapy, milieu therapy and occupational therapy  Psychoeducation (benefits and potential risks) discussed, importance of compliance with the psychiatric treatment reiterated, and the patient verbalized understanding of the matter  o Continue to assess for adverse medication side effects   o Encourage Princess Del Angel to participate in nonverbal forms of therapy including journaling and art/music therapy  o Continue frequent safety checks and VS per unit protocol  Fall precautions  Encourage early mobility and structured day  o Encourage adequate hydration, nutrition, BM  o Regulate sleep-wake cycle  o Continue to engage CM/SW to assist with collateral, disposition planning, and the implementation of an individualized, patient-centered plan of care  o Continue medical management per SLIM recs   o Case discussed with treatment team   • Dispo: ongoing planning w/ ultimate plan to go live with granddaughter in [place] - is willing to go home w/ Sherrell Pipe as long as he does not require money for this (will continue to f/u w/ CM)    ------------------------------------------------------------    Subjective: All documentation including nursing notes, medication history to ensure medication adherence on the unit, labs, and vitals were reviewed   Rich Solomon was evaluated this morning "for continuity of care and no acute distress noted throughout the evaluation  Over the past 24 hours per nursing report, Leilani Mora has been cooperative on the unit and compliant with medications  Today, Leilani Mora is consenting for safety on the unit  Leilani Mora reports feeling \"good  \" Leilani Mora notes having good sleep  Leilani Mora states having a good appetite  Leilani Mora has been taking the medications as prescribed and reporting no side effects  Patient was seen and examined this morning  She is eager to go home and get started with her life in [facility] w/ granddaughter and great-grandchildren  She is willing to go home w/ Digna Brown and does not necessarily need him out of her house but she is not willing to give him any more money and states she spoke with Nancy who said she would speak to him and make sure he wasn't requesting any more from her  She has good appetite and sleep and feels less foggy  Leilani Mora denies SI/HI/AVH  PRNs overnight: none   VS: Reviewed, /95 (BP Location: Left arm)   Pulse 94   Temp 98 2 °F (36 8 °C) (Temporal)   Resp 16   Ht 4' 2\" (1 27 m)   Wt 58 4 kg (128 lb 12 oz)   SpO2 98%   BMI 36 21 kg/m²      Progress Toward Goals: slow improvement    Psychiatric Review of Systems:  Behavior over the last 24 hours:  good  Sleep: normal  Appetite: normal  Medication side effects: No   ROS: no complaints, all other systems are negative    Vital signs in last 24 hours:  Temp:  [97 2 °F (36 2 °C)-98 2 °F (36 8 °C)] 98 2 °F (36 8 °C)  HR:  [82-94] 94  Resp:  [16-17] 16  BP: (125-133)/(60-95) 133/95    Laboratory results:  I have personally reviewed all pertinent laboratory/tests results  No results found for this or any previous visit (from the past 48 hour(s))        Mental Status Evaluation:    Appearance:  age appropriate, adequate grooming, looks stated age   Behavior:  cooperative, calm   Speech:  normal rate and volume, fluent    Mood:  \"good\"   Affect:  normal range and intensity, appropriate    Thought " Process:  organized, coherent, linear    Associations: intact associations    Thought Content:  no overt delusions   Perceptual Disturbances: Denies auditory or visual hallucinations    Risk Potential: Suicidal ideation - None at present  Homicidal ideation - None at present  Potential for aggression - Not at present   Sensorium:  oriented to person and place   Memory:  recent and remote memory grossly intact    Consciousness:  alert and awake    Attention/Concentration: attention span and concentration are age appropriate    Insight:  good   Judgment: fair to good   Gait/Station: normal gait/station    Motor Activity: no abnormal movements        Current Medications:  Current Facility-Administered Medications   Medication Dose Route Frequency Provider Last Rate   • acetaminophen  650 mg Oral Q4H PRN Lora Blinks, CRNP     • acetaminophen  650 mg Oral Q4H PRN Lora Blinks, CRNP     • acetaminophen  975 mg Oral Q6H PRN Lora Blinks, CRNP     • amLODIPine  10 mg Oral Daily MAYRA MoralesNP     • atenolol  100 mg Oral Daily MAYRA MoralesNP     • cholecalciferol  2,000 Units Oral Daily Fabiano Howell PA-C     • cyanocobalamin  1,000 mcg Oral Daily Fabiano Howell PA-C     • cyanocobalamin  1,000 mcg Intramuscular Q30 Days Fabiano Howell PA-C     • donepezil  5 mg Oral HS Kassi Mcnamara DO     • hydrochlorothiazide  25 mg Oral Daily LILIYA Morales     • hydrOXYzine HCL  25 mg Oral Q6H PRN Max 4/day Lora Blinks, CRNP     • hydrOXYzine HCL  50 mg Oral Q6H PRN Max 4/day Lora Blinks, CRNP     • LORazepam  1 mg Intramuscular Q6H PRN Max 3/day Lora Blinks, CRNP     • LORazepam  1 mg Oral Q6H PRN Max 3/day Lora Blinks, CRNP     • losartan  25 mg Oral Daily Kimberli Russ CRNP     • OLANZapine  5 mg Intramuscular Q3H PRN Max 3/day Kassi Mcnamara DO     • QUEtiapine  100 mg Oral Q3H PRN Max 3/day Kassi Mcnamara, DO     • QUEtiapine  25 mg Oral Q6H PRN Max 4/day Elmer Divers, DO     • QUEtiapine  50 mg Oral Q6H PRN Max 4/day Elmer Divers, DO     • traZODone  50 mg Oral HS PRN LILIYA Serrano         Suicide/Homicide Risk Assessment:  Risk of Harm to Self:   • Nursing Suicide Risk Assessment Last 24 hours:    • Current Specific Risk Factors include: diagnosis of depression  • Protective Factors: no current suicidal ideation  • Based on today's assessment, Sixtomare Paul presents the following risk of harm to self: none    Risk of Harm to Others:  • Nursing Homicide Risk Assessment: Violence Risk to Others: Yes- Within the last 6 months  • Current Specific Risk Factors include: none  • Protective Factors: no current homicidal ideation, willing to continue psychiatric treatment  • Based on today's assessment, Jones Paul presents the following risk of harm to others: none    The following interventions are recommended: continued hospitalization on locked unit    Behavioral Health Medications: All current active meds have been reviewed  Changes as in plan section above  Risks, benefits and possible side effects of Medications:   Risks, benefits, and possible side effects of medications explained to patient and patient verbalizes understanding  Counseling / Coordination of Care:  Patient's progress discussed with staff in treatment team meeting  Medications, treatment progress and treatment plan reviewed with patient  Amado Pennington MD 05/24/23  Neurology Resident, PGY-3    This note was completed in part utilizing [employer] Direct Software  Grammatical, translation, syntax errors, random word insertions, spelling mistakes, and incomplete sentences may be an occasional consequence of this system secondary to software limitations with voice recognition, ambient noise, and hardware issues  If you have any questions or concerns about the content, text, or information contained within the body of this dictation, please contact the provider for clarification

## 2023-05-24 NOTE — PLAN OF CARE
Problem: Alteration in Thoughts and Perception  Goal: Treatment Goal: Gain control of psychotic behaviors/thinking, reduce/eliminate presenting symptoms and demonstrate improved reality functioning upon discharge  Outcome: Progressing  Goal: Verbalize thoughts and feelings  Description: Interventions:  - Promote a nonjudgmental and trusting relationship with the patient through active listening and therapeutic communication  - Assess patient's level of functioning, behavior and potential for risk  - Engage patient in 1 on 1 interactions  - Encourage patient to express fears, feelings, frustrations, and discuss symptoms    - [place] patient to reality, help patient recognize reality-based thinking   - Administer medications as ordered and assess for potential side effects  - Provide the patient education related to the signs and symptoms of the illness and desired effects of prescribed medications  Outcome: Progressing  Goal: Refrain from acting on delusional thinking/internal stimuli  Description: Interventions:  - Monitor patient closely, per order   - Utilize least restrictive measures   - Set reasonable limits, give positive feedback for acceptable   - Administer medications as ordered and monitor of potential side effects  Outcome: Progressing  Goal: Agree to be compliant with medication regime, as prescribed and report medication side effects  Description: Interventions:  - Offer appropriate PRN medication and supervise ingestion; conduct AIMS, as needed   Outcome: Progressing  Goal: Recognize dysfunctional thoughts, communicate reality-based thoughts at the time of discharge  Description: Interventions:  - Provide medication and psycho-education to assist patient in compliance and developing insight into his/her illness   Outcome: Progressing  Goal: Complete daily ADLs, including personal hygiene independently, as able  Description: Interventions:  - Observe, teach, and assist patient with ADLS  - Monitor and promote a balance of rest/activity, with adequate nutrition and elimination   Outcome: Progressing     Problem: Alteration in Thoughts and Perception  Goal: Attend and participate in unit activities, including therapeutic, recreational, and educational groups  Description: Interventions:  -Encourage Visitation and family involvement in care  Outcome: Progressing

## 2023-05-24 NOTE — TREATMENT TEAM
05/24/23 0834   Team Meeting   Meeting Type Daily Rounds   Initial Conference Date 05/24/23   Team Members Present   Team Members Present Physician;Nurse;;; Other (Discipline and Name)   Physician Team Member Dr Codey Rajan Team Member [facility] Management Team Member Xavier Pizarro Work Team Member Isael   Other (Discipline and Name) Benítez Searcy- pharmacist   Patient/Family Present   Patient Present No   Patient's Family Present No     Good appetite, denies s/s, cont with attempts to contact g'dtr for safe home d/c plan; aging involved

## 2023-05-24 NOTE — PLAN OF CARE
Pt did not attend groups when prompted or offered       Problem: Alteration in Thoughts and Perception  Goal: Attend and participate in unit activities, including therapeutic, recreational, and educational groups  Description: Interventions:  -Encourage Visitation and family involvement in care  Outcome: Not Progressing

## 2023-05-24 NOTE — NURSING NOTE
Pt is visible on unit, social with peers  Pt is pleasant and cooperative with care  Pt denies all psych s/s, meal and medication compliant, safety checks on going

## 2023-05-25 VITALS
HEART RATE: 82 BPM | RESPIRATION RATE: 16 BRPM | HEIGHT: 55 IN | SYSTOLIC BLOOD PRESSURE: 124 MMHG | BODY MASS INDEX: 29.8 KG/M2 | TEMPERATURE: 97.9 F | DIASTOLIC BLOOD PRESSURE: 61 MMHG | OXYGEN SATURATION: 98 % | WEIGHT: 128.75 LBS

## 2023-05-25 PROBLEM — Z00.8 MEDICAL CLEARANCE FOR PSYCHIATRIC ADMISSION: Status: RESOLVED | Noted: 2023-05-16 | Resolved: 2023-05-25

## 2023-05-25 RX ORDER — DONEPEZIL HYDROCHLORIDE 5 MG/1
5 TABLET, FILM COATED ORAL
Qty: 30 TABLET | Refills: 2 | Status: SHIPPED | OUTPATIENT
Start: 2023-05-25

## 2023-05-25 RX ORDER — LOSARTAN POTASSIUM 25 MG/1
25 TABLET ORAL DAILY
Qty: 30 TABLET | Refills: 2 | Status: SHIPPED | OUTPATIENT
Start: 2023-05-25

## 2023-05-25 RX ORDER — HYDROCHLOROTHIAZIDE 25 MG/1
25 TABLET ORAL DAILY
Qty: 30 TABLET | Refills: 2 | Status: SHIPPED | OUTPATIENT
Start: 2023-05-25

## 2023-05-25 RX ORDER — ATENOLOL 100 MG/1
100 TABLET ORAL DAILY
Qty: 30 TABLET | Refills: 2 | Status: SHIPPED | OUTPATIENT
Start: 2023-05-25

## 2023-05-25 RX ORDER — AMLODIPINE BESYLATE 10 MG/1
10 TABLET ORAL DAILY
Qty: 30 TABLET | Refills: 2 | Status: SHIPPED | OUTPATIENT
Start: 2023-05-25

## 2023-05-25 RX ADMIN — LOSARTAN POTASSIUM 25 MG: 25 TABLET, FILM COATED ORAL at 08:04

## 2023-05-25 RX ADMIN — CYANOCOBALAMIN TAB 1000 MCG 1000 MCG: 1000 TAB at 08:04

## 2023-05-25 RX ADMIN — ATENOLOL 100 MG: 50 TABLET ORAL at 08:04

## 2023-05-25 RX ADMIN — AMLODIPINE BESYLATE 10 MG: 10 TABLET ORAL at 08:04

## 2023-05-25 RX ADMIN — CHOLECALCIFEROL TAB 25 MCG (1000 UNIT) 2000 UNITS: 25 TAB at 08:04

## 2023-05-25 RX ADMIN — HYDROCHLOROTHIAZIDE 25 MG: 25 TABLET ORAL at 08:04

## 2023-05-25 NOTE — CASE MANAGEMENT
CM spoke with pt's g'dtr Jil, reviewed pt's d/c and concerns regarding pt not wanting to go with [facility] reports she spoke with pt and [facility] reports [facility] does not have access to pt's bank accounts  Jil reports she spoke with [address] [place] [address] and informed them of [facility] not to have any access to pt's finances  Jil reports [facility] will  pt today although unknown time as Cyndi Vora has court at 1 PM and will  pt afterward  Jil reports plans to drive to [place] this weekend to come get patient  CM informed Jil of need for pt to be connected with services in [facility]  CM met with pt, reviewed recommendation that pt no longer take bus to [place] alone due to confusion  CM reviewed option for  PCP in [place]  Pt in agreement with [facility] PCP information and referral  CM also reviewed pt's need to have d/c meds transferred from [facility] to [facility]  Pt aware of transport by [facility] V[facility] and in agreement with [facility] remaining in her home      Call made to [facility], lvm requesting c/b for new patient referral

## 2023-05-25 NOTE — PLAN OF CARE
Problem: DISCHARGE PLANNING - CARE MANAGEMENT  Goal: Discharge to post-acute care or home with appropriate resources  Description: INTERVENTIONS:  - Conduct assessment to determine patient/family and health care team treatment goals, and need for post-acute services based on payer coverage, community resources, and patient preferences, and barriers to discharge  - Address psychosocial, clinical, and financial barriers to discharge as identified in assessment in conjunction with the patient/family and health care team  - Arrange appropriate level of post-acute services according to patient’s   needs and preference and payer coverage in collaboration with the physician and health care team  - Communicate with and update the patient/family, physician, and health care team regarding progress on the discharge plan  - Arrange appropriate transportation to post-acute venues  Outcome: Adequate for Discharge     Discharge home with nephew, given referral for new PCP; plan for pt to move to [place] with granddaughter  DC meds to preferred pharmacy

## 2023-05-25 NOTE — DISCHARGE INSTR - APPOINTMENTS
Janie Lang or Anyi, our Lizbet and Daraina, will be calling you after your discharge, on the phone number that you provided  They will be available as an additional support, if needed  If you wish to speak with one of them, you may contact Didier Mercedes at [phone] or Fany Chin at [phone]

## 2023-05-25 NOTE — PLAN OF CARE
Problem: Alteration in Thoughts and Perception  Goal: Treatment Goal: Gain control of psychotic behaviors/thinking, reduce/eliminate presenting symptoms and demonstrate improved reality functioning upon discharge  5/25/2023 1408 by Evangelina Cooper LPN  Outcome: Completed  5/25/2023 0837 by Evangelina Cooper LPN  Outcome: Progressing  Goal: Verbalize thoughts and feelings  Description: Interventions:  - Promote a nonjudgmental and trusting relationship with the patient through active listening and therapeutic communication  - Assess patient's level of functioning, behavior and potential for risk  - Engage patient in 1 on 1 interactions  - Encourage patient to express fears, feelings, frustrations, and discuss symptoms    - [place] patient to reality, help patient recognize reality-based thinking   - Administer medications as ordered and assess for potential side effects  - Provide the patient education related to the signs and symptoms of the illness and desired effects of prescribed medications  5/25/2023 1408 by Evangelina Cooper LPN  Outcome: Completed  5/25/2023 0837 by Evangelina Cooper LPN  Outcome: Progressing  Goal: Refrain from acting on delusional thinking/internal stimuli  Description: Interventions:  - Monitor patient closely, per order   - Utilize least restrictive measures   - Set reasonable limits, give positive feedback for acceptable   - Administer medications as ordered and monitor of potential side effects  5/25/2023 1408 by Evangelina Cooper LPN  Outcome: Completed  5/25/2023 0837 by Evangelina Cooper LPN  Outcome: Progressing  Goal: Agree to be compliant with medication regime, as prescribed and report medication side effects  Description: Interventions:  - Offer appropriate PRN medication and supervise ingestion; conduct AIMS, as needed   5/25/2023 1408 by Evangelina Cooper LPN  Outcome: Completed  5/25/2023 0837 by Evangelina Cooper LPN  Outcome: Progressing  Goal: Recognize dysfunctional thoughts, communicate reality-based thoughts at the time of discharge  Description: Interventions:  - Provide medication and psycho-education to assist patient in compliance and developing insight into his/her illness   5/25/2023 1408 by Georges Mills LPN  Outcome: Completed  5/25/2023 0837 by Georges Mills LPN  Outcome: Progressing  Goal: Complete daily ADLs, including personal hygiene independently, as able  Description: Interventions:  - Observe, teach, and assist patient with ADLS  - Monitor and promote a balance of rest/activity, with adequate nutrition and elimination   5/25/2023 1408 by Georges Mills LPN  Outcome: Completed  5/25/2023 0837 by Georges Mills LPN  Outcome: Progressing     Problem: DISCHARGE PLANNING - CARE MANAGEMENT  Goal: Discharge to post-acute care or home with appropriate resources  Description: INTERVENTIONS:  - Conduct assessment to determine patient/family and health care team treatment goals, and need for post-acute services based on payer coverage, community resources, and patient preferences, and barriers to discharge  - Address psychosocial, clinical, and financial barriers to discharge as identified in assessment in conjunction with the patient/family and health care team  - Arrange appropriate level of post-acute services according to patient’s   needs and preference and payer coverage in collaboration with the physician and health care team  - Communicate with and update the patient/family, physician, and health care team regarding progress on the discharge plan  - Arrange appropriate transportation to post-acute venues  5/25/2023 1408 by Georges Mills LPN  Outcome: Completed  5/25/2023 0837 by Georges Mills LPN  Outcome: Progressing     Problem: Alteration in Thoughts and Perception  Goal: Attend and participate in unit activities, including therapeutic, recreational, and educational groups  Description: Interventions:  -Encourage Visitation and family involvement in care  5/25/2023 1408 by Luz Maria Sierra LPN  Outcome: Completed  5/25/2023 0837 by Luz Maria Sierra LPN  Outcome: Progressing

## 2023-05-25 NOTE — DISCHARGE INSTR - OTHER ORDERS
You are being discharged to your home: [address] [facility]     If you are unable to deal with your distressed mood alone please contact [address] [place] [address] # [phone], dial [phone] or go to the nearest emergency center
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
## 2023-05-25 NOTE — PLAN OF CARE
Problem: Alteration in Thoughts and Perception  Goal: Treatment Goal: Gain control of psychotic behaviors/thinking, reduce/eliminate presenting symptoms and demonstrate improved reality functioning upon discharge  Outcome: Progressing  Goal: Verbalize thoughts and feelings  Description: Interventions:  - Promote a nonjudgmental and trusting relationship with the patient through active listening and therapeutic communication  - Assess patient's level of functioning, behavior and potential for risk  - Engage patient in 1 on 1 interactions  - Encourage patient to express fears, feelings, frustrations, and discuss symptoms    - Primrose patient to reality, help patient recognize reality-based thinking   - Administer medications as ordered and assess for potential side effects  - Provide the patient education related to the signs and symptoms of the illness and desired effects of prescribed medications  Outcome: Progressing  Goal: Refrain from acting on delusional thinking/internal stimuli  Description: Interventions:  - Monitor patient closely, per order   - Utilize least restrictive measures   - Set reasonable limits, give positive feedback for acceptable   - Administer medications as ordered and monitor of potential side effects  Outcome: Progressing  Goal: Agree to be compliant with medication regime, as prescribed and report medication side effects  Description: Interventions:  - Offer appropriate PRN medication and supervise ingestion; conduct AIMS, as needed   Outcome: Progressing  Goal: Recognize dysfunctional thoughts, communicate reality-based thoughts at the time of discharge  Description: Interventions:  - Provide medication and psycho-education to assist patient in compliance and developing insight into his/her illness   Outcome: Progressing  Goal: Complete daily ADLs, including personal hygiene independently, as able  Description: Interventions:  - Observe, teach, and assist patient with ADLS  - Monitor and promote a balance of rest/activity, with adequate nutrition and elimination   Outcome: Progressing     Problem: DISCHARGE PLANNING - CARE MANAGEMENT  Goal: Discharge to post-acute care or home with appropriate resources  Description: INTERVENTIONS:  - Conduct assessment to determine patient/family and health care team treatment goals, and need for post-acute services based on payer coverage, community resources, and patient preferences, and barriers to discharge  - Address psychosocial, clinical, and financial barriers to discharge as identified in assessment in conjunction with the patient/family and health care team  - Arrange appropriate level of post-acute services according to patient’s   needs and preference and payer coverage in collaboration with the physician and health care team  - Communicate with and update the patient/family, physician, and health care team regarding progress on the discharge plan  - Arrange appropriate transportation to post-acute venues  Outcome: Progressing     Problem: Alteration in Thoughts and Perception  Goal: Attend and participate in unit activities, including therapeutic, recreational, and educational groups  Description: Interventions:  -Encourage Visitation and family involvement in care  Outcome: Progressing

## 2023-05-25 NOTE — TREATMENT TEAM
05/25/23 6490   Team Meeting   Meeting Type Daily Rounds   Initial Conference Date 05/25/23   Team Members Present   Team Members Present Physician;Nurse;;   Physician Team Member Dr Linnea Anderson Team Member [facility] Management Team Member Xavier Pizarro Work Team Member Tulio Blas   Patient/Family Present   Patient Present No   Patient's Family Present No     Denies s/s, pleasant, cooperative, plan d/c today

## 2023-05-25 NOTE — BH TRANSITION RECORD
"Contact Information: If you have any questions, concerns, pended studies, tests and/or procedures, or emergencies regarding your inpatient behavioral health visit  Please contact [facility] older adult behavioral health unit 6B [phone] and ask to speak to a , nurse or physician  A contact is available 24 hours/ 7 days a week at this number  Summary of Procedures Performed During your Stay:  Below is a list of major procedures performed during your hospital stay and a summary of results:  - Major Imaging Studies: Head CT       MCV   Date Value Ref Range Status   05/17/2023 99 (H) 82 - 98 fL Final     WBC   Date Value Ref Range Status   05/17/2023 3 93 (L) 4 31 - 10 16 Thousand/uL Final     WBC, UA   Date Value Ref Range Status   05/16/2023 2-4 None Seen, 0-1, 1-2, 0-5, 2-4 /hpf Final     Lab Results   Component Value Date    BUN 24 05/17/2023    CO2 24 05/17/2023    SODIUM 141 05/17/2023     Lab Results   Component Value Date    ALKPHOS 82 05/17/2023     No results found for: \"CKMB\"  No results found for: \"TSH\"  No results found for: \"INR\"  No results found for: \"APTT\"  No results found for: \"PHENO\"  BUN   Date Value Ref Range Status   05/17/2023 24 5 - 25 mg/dL Final     Creatinine   Date Value Ref Range Status   05/17/2023 1 03 0 60 - 1 30 mg/dL Final     Comment:     Standardized to IDMS reference method     Sodium   Date Value Ref Range Status   05/17/2023 141 135 - 147 mmol/L Final     TSH 3RD GENERATON   Date Value Ref Range Status   05/19/2023 1 562 0 450 - 4 500 uIU/mL Final     Comment:     The recommended reference ranges for TSH during pregnancy are as follows:   First trimester 0 1 to 2 5 uIU/mL   Second trimester  0 2 to 3 0 uIU/mL   Third trimester 0 3 to 3 0 uIU/m    Note: Normal ranges may not apply to patients who are transgender, non-binary, or whose legal sex, sex at birth, and gender identity differ    Adult TSH (3rd generation) reference range follows the recommended " "guidelines of the [employer], January, 2020  WBC   Date Value Ref Range Status   05/17/2023 3 93 (L) 4 31 - 10 16 Thousand/uL Final     WBC, UA   Date Value Ref Range Status   05/16/2023 2-4 None Seen, 0-1, 1-2, 0-5, 2-4 /hpf Final     No components found for: \"B12\"  Lab Results   Component Value Date    FOLATE 18 6 05/17/2023     No results found for: \"RPR\"    Imaging  CT head wo contrast   Final Result      No acute intracranial abnormality  Workstation performed: [id number]             Pending Studies (From admission, onward)    None        Please follow up on the above pending studies with your PCP and/or referring provider    "

## 2023-07-12 ENCOUNTER — TELEPHONE (OUTPATIENT)
Dept: NEUROLOGY | Facility: CLINIC | Age: 73
End: 2023-07-12

## 2023-07-12 NOTE — TELEPHONE ENCOUNTER
Received VM transcription:    Good morning, my name is Rosi. I'm calling from the [facility] office of aging. This is regarding a patient that was there recently, recently admitted at the older adult behavior health unit. There's an ongoing investigation for this patient. Her name is Pietro. Date of birth [birth year]. There's a few questions I'd like to ask. Please give me a call back at [phone]. Thank you and have a great day.  -------------------------------------------------------    Spoke with Rosi and informed her that pt is not established pt with outpatient neurology. Has never been seen in our office. Rosi says pt was evaluated at [facility] in the behavioral unit by Dr. Simeon Poole. Rosi to call [facility] to get more information.
